# Patient Record
Sex: FEMALE | Race: WHITE | NOT HISPANIC OR LATINO | Employment: FULL TIME | URBAN - METROPOLITAN AREA
[De-identification: names, ages, dates, MRNs, and addresses within clinical notes are randomized per-mention and may not be internally consistent; named-entity substitution may affect disease eponyms.]

---

## 2019-03-29 ENCOUNTER — OFFICE VISIT (OUTPATIENT)
Dept: FAMILY MEDICINE CLINIC | Facility: CLINIC | Age: 29
End: 2019-03-29
Payer: COMMERCIAL

## 2019-03-29 VITALS
RESPIRATION RATE: 16 BRPM | TEMPERATURE: 98.2 F | HEART RATE: 67 BPM | BODY MASS INDEX: 25.67 KG/M2 | HEIGHT: 64 IN | SYSTOLIC BLOOD PRESSURE: 112 MMHG | OXYGEN SATURATION: 96 % | WEIGHT: 150.38 LBS | DIASTOLIC BLOOD PRESSURE: 64 MMHG

## 2019-03-29 DIAGNOSIS — R53.82 CHRONIC FATIGUE: ICD-10-CM

## 2019-03-29 DIAGNOSIS — E55.9 VITAMIN D INSUFFICIENCY: ICD-10-CM

## 2019-03-29 DIAGNOSIS — Z00.00 HEALTHCARE MAINTENANCE: Primary | ICD-10-CM

## 2019-03-29 DIAGNOSIS — E66.3 OVERWEIGHT (BMI 25.0-29.9): ICD-10-CM

## 2019-03-29 DIAGNOSIS — Z23 ENCOUNTER FOR IMMUNIZATION: ICD-10-CM

## 2019-03-29 PROCEDURE — 3725F SCREEN DEPRESSION PERFORMED: CPT | Performed by: FAMILY MEDICINE

## 2019-03-29 PROCEDURE — 99395 PREV VISIT EST AGE 18-39: CPT | Performed by: FAMILY MEDICINE

## 2019-03-29 RX ORDER — PHENTERMINE HYDROCHLORIDE 15 MG/1
15 CAPSULE ORAL EVERY MORNING
Qty: 30 CAPSULE | Refills: 0 | Status: SHIPPED | OUTPATIENT
Start: 2019-03-29 | End: 2021-06-07 | Stop reason: ALTCHOICE

## 2019-03-29 NOTE — PATIENT INSTRUCTIONS

## 2019-03-29 NOTE — PROGRESS NOTES
Assessment/Plan:     Diagnoses and all orders for this visit:    Healthcare maintenance  Comments:  Discussed about immunizations, diet, exercise and safety measures  Orders:  -     Lipid Panel with Direct LDL reflex; Future    BMI 25 0-25 9,adult  Comments:  I am going to start her on phentermine 15 mg daily  It was discussed about side effects  It was discussed about low carb diet and regular exercise  Orders:  -     phentermine 15 MG capsule; Take 1 capsule (15 mg total) by mouth every morning    Encounter for immunization  -     PREFERRED: influenza vaccine, 2431-0332, quadrivalent, recombinant, PF, 0 5 mL (FLUBLOK)    Vitamin D insufficiency  -     Vitamin D 25 hydroxy; Future    Chronic fatigue  -     CBC and differential; Future  -     Comprehensive metabolic panel; Future  -     TSH, 3rd generation with Free T4 reflex; Future    Overweight (BMI 25 0-29  9)          Patient Instructions     Weight Management   AMBULATORY CARE:   Why it is important to manage your weight:  Being overweight increases your risk of health conditions such as heart disease, high blood pressure, type 2 diabetes, and certain types of cancer  It can also increase your risk for osteoarthritis, sleep apnea, and other respiratory problems  Aim for a slow, steady weight loss  Even a small amount of weight loss can lower your risk of health problems  How to lose weight safely:  A safe and healthy way to lose weight is to eat fewer calories and get regular exercise  You can lose up about 1 pound a week by decreasing the number of calories you eat by 500 calories each day  You can decrease calories by eating smaller portion sizes or by cutting out high-calorie foods  Read labels to find out how many calories are in the foods you eat  You can also burn calories with exercise such as walking, swimming, or biking  You will be more likely to keep weight off if you make these changes part of your lifestyle     Healthy meal plan for weight management:  A healthy meal plan includes a variety of foods, contains fewer calories, and helps you stay healthy  A healthy meal plan includes the following:  · Eat whole-grain foods more often  A healthy meal plan should contain fiber  Fiber is the part of grains, fruits, and vegetables that is not broken down by your body  Whole-grain foods are healthy and provide extra fiber in your diet  Some examples of whole-grain foods are whole-wheat breads and pastas, oatmeal, brown rice, and bulgur  · Eat a variety of vegetables every day  Include dark, leafy greens such as spinach, kale, saba greens, and mustard greens  Eat yellow and orange vegetables such as carrots, sweet potatoes, and winter squash  · Eat a variety of fruits every day  Choose fresh or canned fruit (canned in its own juice or light syrup) instead of juice  Fruit juice has very little or no fiber  · Eat low-fat dairy foods  Drink fat-free (skim) milk or 1% milk  Eat fat-free yogurt and low-fat cottage cheese  Try low-fat cheeses such as mozzarella and other reduced-fat cheeses  · Choose meat and other protein foods that are low in fat  Choose beans or other legumes such as split peas or lentils  Choose fish, skinless poultry (chicken or turkey), or lean cuts of red meat (beef or pork)  Before you cook meat or poultry, cut off any visible fat  · Use less fat and oil  Try baking foods instead of frying them  Add less fat, such as margarine, sour cream, regular salad dressing and mayonnaise to foods  Eat fewer high-fat foods  Some examples of high-fat foods include french fries, doughnuts, ice cream, and cakes  · Eat fewer sweets  Limit foods and drinks that are high in sugar  This includes candy, cookies, regular soda, and sweetened drinks  Ways to decrease calories:   · Eat smaller portions  ¨ Use a small plate with smaller servings  ¨ Do not eat second helpings      ¨ When you eat at a restaurant, ask for a box and place half of your meal in the box before you eat  ¨ Share an entrée with someone else  · Replace high-calorie snacks with healthy, low-calorie snacks  ¨ Choose fresh fruit, vegetables, fat-free rice cakes, or air-popped popcorn instead of potato chips, nuts, or chocolate  ¨ Choose water or calorie-free drinks instead of soda or sweetened drinks  · Eat regular meals  Skipping meals can lead to overeating later in the day  Eat a healthy snack in place of a meal if you do not have time to eat a regular meal      · Do not shop for groceries when you are hungry  You may be more likely to make unhealthy food choices  Take a grocery list of healthy foods and shop after you have eaten  Exercise:  Exercise at least 30 minutes per day on most days of the week  Some examples of exercise include walking, biking, dancing, and swimming  You can also fit in more physical activity by taking the stairs instead of the elevator or parking farther away from stores  Ask your healthcare provider about the best exercise plan for you  Other things to consider as you try to lose weight:   · Be aware of situations that may give you the urge to overeat, such as eating while watching television  Find ways to avoid these situations  For example, read a book, go for a walk, or do crafts  · Meet with a weight loss support group or friends who are also trying to lose weight  This may help you stay motivated to continue working on your weight loss goals  © 2017 [employer] Information is for End User's use only and may not be sold, redistributed or otherwise used for commercial purposes  All illustrations and images included in CareNotes® are the copyrighted property of [employer]  or Kike Marquez  The above information is an  only  It is not intended as medical advice for individual conditions or treatments   Talk to your doctor, nurse or pharmacist before following any medical regimen to see if it is safe and effective for you  Return in about 1 month (around 4/29/2019)  Subjective:      Patient ID: Brent Chase is a 30 y o  female  Chief Complaint   Patient presents with    Physical Exam       She is here today for wellness exam   She complained of gaining weight since the last time she was seen here  She watch her diet but she does not exercise  The following portions of the patient's history were reviewed and updated as appropriate: allergies, current medications, past family history, past medical history, past social history, past surgical history and problem list     Review of Systems   Constitutional: Negative for chills and fever  HENT: Negative for trouble swallowing  Eyes: Negative for visual disturbance  Respiratory: Negative for cough and shortness of breath  Cardiovascular: Negative for chest pain, palpitations and leg swelling  Gastrointestinal: Negative for abdominal pain, constipation and diarrhea  Endocrine: Negative for cold intolerance and heat intolerance  Genitourinary: Negative for difficulty urinating and dysuria  Musculoskeletal: Negative for gait problem  Skin: Negative for rash  Neurological: Negative for dizziness, tremors, seizures and headaches  Hematological: Negative for adenopathy  Psychiatric/Behavioral: Negative for behavioral problems  Current Outpatient Medications   Medication Sig Dispense Refill    phentermine 15 MG capsule Take 1 capsule (15 mg total) by mouth every morning 30 capsule 0     No current facility-administered medications for this visit  Objective:    /64 (BP Location: Left arm, Patient Position: Sitting, Cuff Size: Adult)   Pulse 67   Temp 98 2 °F (36 8 °C) (Tympanic)   Resp 16   Ht 5' 4" (1 626 m)   Wt 68 2 kg (150 lb 6 oz)   SpO2 96%   BMI 25 81 kg/m²        Physical Exam   Constitutional: She is oriented to person, place, and time   She appears well-developed and well-nourished  HENT:   Head: Normocephalic and atraumatic  Eyes: Pupils are equal, round, and reactive to light  EOM are normal    Neck: Normal range of motion  Neck supple  Cardiovascular: Normal rate, regular rhythm and normal heart sounds  Pulmonary/Chest: Effort normal and breath sounds normal    Abdominal: Soft  Bowel sounds are normal    Musculoskeletal: Normal range of motion  She exhibits no edema  Lymphadenopathy:     She has no cervical adenopathy  Neurological: She is alert and oriented to person, place, and time  No cranial nerve deficit  Skin: Skin is warm  Psychiatric: She has a normal mood and affect  Nursing note and vitals reviewed  David Vizcarra MD BMI Counseling: Body mass index is 25 81 kg/m²  Discussed the patient's BMI with her  The BMI is above average  BMI counseling and education was provided to the patient  Nutrition recommendations include reducing portion sizes, decreasing overall calorie intake, 3-5 servings of fruits/vegetables daily, reducing fast food intake and consuming healthier snacks  Exercise recommendations include moderate aerobic physical activity for 150 minutes/week

## 2019-04-09 ENCOUNTER — APPOINTMENT (OUTPATIENT)
Dept: LAB | Facility: IMAGING CENTER | Age: 29
End: 2019-04-09
Payer: COMMERCIAL

## 2019-04-09 ENCOUNTER — TRANSCRIBE ORDERS (OUTPATIENT)
Dept: ADMINISTRATIVE | Facility: HOSPITAL | Age: 29
End: 2019-04-09

## 2019-04-09 DIAGNOSIS — Z00.00 HEALTHCARE MAINTENANCE: ICD-10-CM

## 2019-04-09 DIAGNOSIS — E55.9 VITAMIN D INSUFFICIENCY: ICD-10-CM

## 2019-04-09 DIAGNOSIS — R53.82 CHRONIC FATIGUE: ICD-10-CM

## 2019-04-09 LAB
25(OH)D3 SERPL-MCNC: 15.5 NG/ML (ref 30–100)
ALBUMIN SERPL BCP-MCNC: 4.2 G/DL (ref 3.5–5)
ALP SERPL-CCNC: 52 U/L (ref 46–116)
ALT SERPL W P-5'-P-CCNC: 16 U/L (ref 12–78)
ANION GAP SERPL CALCULATED.3IONS-SCNC: 2 MMOL/L (ref 4–13)
AST SERPL W P-5'-P-CCNC: 8 U/L (ref 5–45)
BASOPHILS # BLD AUTO: 0.02 THOUSANDS/ΜL (ref 0–0.1)
BASOPHILS NFR BLD AUTO: 0 % (ref 0–1)
BILIRUB SERPL-MCNC: 0.54 MG/DL (ref 0.2–1)
BUN SERPL-MCNC: 7 MG/DL (ref 5–25)
CALCIUM SERPL-MCNC: 8.8 MG/DL (ref 8.3–10.1)
CHLORIDE SERPL-SCNC: 106 MMOL/L (ref 100–108)
CHOLEST SERPL-MCNC: 183 MG/DL (ref 50–200)
CO2 SERPL-SCNC: 28 MMOL/L (ref 21–32)
CREAT SERPL-MCNC: 0.69 MG/DL (ref 0.6–1.3)
EOSINOPHIL # BLD AUTO: 0.03 THOUSAND/ΜL (ref 0–0.61)
EOSINOPHIL NFR BLD AUTO: 1 % (ref 0–6)
ERYTHROCYTE [DISTWIDTH] IN BLOOD BY AUTOMATED COUNT: 12.1 % (ref 11.6–15.1)
GFR SERPL CREATININE-BSD FRML MDRD: 116 ML/MIN/1.73SQ M
GLUCOSE P FAST SERPL-MCNC: 86 MG/DL (ref 65–99)
HCT VFR BLD AUTO: 42.4 % (ref 34.8–46.1)
HDLC SERPL-MCNC: 42 MG/DL (ref 40–60)
HGB BLD-MCNC: 14.1 G/DL (ref 11.5–15.4)
IMM GRANULOCYTES # BLD AUTO: 0.01 THOUSAND/UL (ref 0–0.2)
IMM GRANULOCYTES NFR BLD AUTO: 0 % (ref 0–2)
LDLC SERPL CALC-MCNC: 121 MG/DL (ref 0–100)
LYMPHOCYTES # BLD AUTO: 2.41 THOUSANDS/ΜL (ref 0.6–4.47)
LYMPHOCYTES NFR BLD AUTO: 42 % (ref 14–44)
MCH RBC QN AUTO: 30.5 PG (ref 26.8–34.3)
MCHC RBC AUTO-ENTMCNC: 33.3 G/DL (ref 31.4–37.4)
MCV RBC AUTO: 92 FL (ref 82–98)
MONOCYTES # BLD AUTO: 0.31 THOUSAND/ΜL (ref 0.17–1.22)
MONOCYTES NFR BLD AUTO: 5 % (ref 4–12)
NEUTROPHILS # BLD AUTO: 2.97 THOUSANDS/ΜL (ref 1.85–7.62)
NEUTS SEG NFR BLD AUTO: 52 % (ref 43–75)
NRBC BLD AUTO-RTO: 0 /100 WBCS
PLATELET # BLD AUTO: 244 THOUSANDS/UL (ref 149–390)
PMV BLD AUTO: 11.6 FL (ref 8.9–12.7)
POTASSIUM SERPL-SCNC: 3.9 MMOL/L (ref 3.5–5.3)
PROT SERPL-MCNC: 7.3 G/DL (ref 6.4–8.2)
RBC # BLD AUTO: 4.62 MILLION/UL (ref 3.81–5.12)
SODIUM SERPL-SCNC: 136 MMOL/L (ref 136–145)
TRIGL SERPL-MCNC: 99 MG/DL
TSH SERPL DL<=0.05 MIU/L-ACNC: 1.63 UIU/ML (ref 0.36–3.74)
WBC # BLD AUTO: 5.75 THOUSAND/UL (ref 4.31–10.16)

## 2019-04-09 PROCEDURE — 80061 LIPID PANEL: CPT

## 2019-04-09 PROCEDURE — 82306 VITAMIN D 25 HYDROXY: CPT

## 2019-04-09 PROCEDURE — 85025 COMPLETE CBC W/AUTO DIFF WBC: CPT

## 2019-04-09 PROCEDURE — 84443 ASSAY THYROID STIM HORMONE: CPT

## 2019-04-09 PROCEDURE — 36415 COLL VENOUS BLD VENIPUNCTURE: CPT

## 2019-04-09 PROCEDURE — 80053 COMPREHEN METABOLIC PANEL: CPT

## 2019-04-10 ENCOUNTER — TELEPHONE (OUTPATIENT)
Dept: FAMILY MEDICINE CLINIC | Facility: CLINIC | Age: 29
End: 2019-04-10

## 2019-04-10 DIAGNOSIS — E55.9 VITAMIN D INSUFFICIENCY: Primary | ICD-10-CM

## 2019-04-10 RX ORDER — ERGOCALCIFEROL 1.25 MG/1
50000 CAPSULE ORAL WEEKLY
Qty: 4 CAPSULE | Refills: 3 | Status: SHIPPED | OUTPATIENT
Start: 2019-04-10

## 2020-01-27 ENCOUNTER — OFFICE VISIT (OUTPATIENT)
Dept: URGENT CARE | Age: 30
End: 2020-01-27
Payer: COMMERCIAL

## 2020-01-27 VITALS
OXYGEN SATURATION: 100 % | WEIGHT: 156 LBS | HEART RATE: 60 BPM | BODY MASS INDEX: 26.63 KG/M2 | DIASTOLIC BLOOD PRESSURE: 71 MMHG | TEMPERATURE: 98.1 F | RESPIRATION RATE: 20 BRPM | SYSTOLIC BLOOD PRESSURE: 118 MMHG | HEIGHT: 64 IN

## 2020-01-27 DIAGNOSIS — H92.01 RIGHT EAR PAIN: Primary | ICD-10-CM

## 2020-01-27 DIAGNOSIS — H92.01 EARACHE ON RIGHT: ICD-10-CM

## 2020-01-27 PROCEDURE — 99283 EMERGENCY DEPT VISIT LOW MDM: CPT | Performed by: NURSE PRACTITIONER

## 2020-01-27 PROCEDURE — 99203 OFFICE O/P NEW LOW 30 MIN: CPT | Performed by: NURSE PRACTITIONER

## 2020-01-27 PROCEDURE — G0382 LEV 3 HOSP TYPE B ED VISIT: HCPCS | Performed by: NURSE PRACTITIONER

## 2020-01-27 RX ORDER — METHYLPREDNISOLONE 4 MG/1
TABLET ORAL
Qty: 21 TABLET | Refills: 0 | Status: SHIPPED | OUTPATIENT
Start: 2020-01-27 | End: 2021-06-07 | Stop reason: ALTCHOICE

## 2020-01-27 NOTE — LETTER
January 27, 2020     Patient: Sandra Hernandez   YOB: 1990   Date of Visit: 1/27/2020       To Whom It May Concern: It is my medical opinion that Sandra Hernandez may return to work on 01/28/2020 if fever free, if not please excuse till 01/29/2020         Sincerely,        BOSTON Cruz    CC: No Recipients

## 2020-01-27 NOTE — PROGRESS NOTES
NAME: Alice Hair is a 30 y o  female  : 1990    MRN: [id number]    /71 (BP Location: Left arm, Patient Position: Sitting, Cuff Size: Standard)   Pulse 60   Temp 98 1 °F (36 7 °C) (Tympanic)   Resp 20   Ht 5' 4" (1 626 m)   Wt 70 8 kg (156 lb)   LMP 2019 (Exact Date)   SpO2 100%   BMI 26 78 kg/m²     Assessment and Plan   Right ear pain [H92 01]  1  Right ear pain  methylPREDNISolone 4 MG tablet therapy pack   2  Earache on right  methylPREDNISolone 4 MG tablet therapy pack       Chichi was seen today for earache  Diagnoses and all orders for this visit:    Right ear pain  -     methylPREDNISolone 4 MG tablet therapy pack; Use as directed on package    Earache on right  -     methylPREDNISolone 4 MG tablet therapy pack; Use as directed on package        Patient Instructions   Patient Instructions   Take meds as directed  Follow up with pcp   Tylenol and motrin for fever or pain  Take steroids as directed      Earache   WHAT YOU NEED TO KNOW:   An earache can be caused by a problem within your ear or from another body area  Common causes include earwax buildup, objects in your ear, injury, infections, or jaw or dental problems  Less often, earaches may be caused by arthritis in your upper spine  DISCHARGE INSTRUCTIONS:   Return to the emergency department if:   · You have a severe earache  · You have ear pain with itching, hearing loss, dizziness, a feeling of fullness in your ear, or ringing in your ears  Contact your healthcare provider if:   · Your ear pain worsens or does not go away with treatment  · You have drainage from your ear  · You have a fever  · Your outer ear becomes red, swollen, and warm  · You have questions or concerns about your condition or care  Medicines: You may need any of the following:  · NSAIDs , such as ibuprofen, help decrease swelling, pain, and fever  This medicine is available with or without a doctor's order   NSAIDs can cause stomach bleeding or kidney problems in certain people  If you take blood thinner medicine, always ask if NSAIDs are safe for you  Always read the medicine label and follow directions  Do not give these medicines to children under 10months of age without direction from your child's healthcare provider  · Acetaminophen  decreases pain and fever  It is available without a doctor's order  Ask how much to take and how often to take it  Follow directions  Acetaminophen can cause liver damage if not taken correctly  · Do not give aspirin to children under 25years of age  Your child could develop Reye syndrome if he takes aspirin  Reye syndrome can cause life-threatening brain and liver damage  Check your child's medicine labels for aspirin, salicylates, or oil of wintergreen  · Take your medicine as directed  Call your healthcare provider if you think your medicine is not helping or if you have side effects  Tell him if you are allergic to any medicine  Keep a list of the medicines, vitamins, and herbs you take  Include the amounts, and when and why you take them  Bring the list or the pill bottles to follow-up visits  Carry your medicine list with you in case of an emergency  Follow up with your healthcare provider as directed:  Write down your questions so you remember to ask them during your visits  © 2017 [employer] Information is for End User's use only and may not be sold, redistributed or otherwise used for commercial purposes  All illustrations and images included in CareNotes® are the copyrighted property of [employer]  or Kike Marquez  The above information is an  only  It is not intended as medical advice for individual conditions or treatments  Talk to your doctor, nurse or pharmacist before following any medical regimen to see if it is safe and effective for you  Proceed to ER if symptoms worsen      Chief Complaint     Chief Complaint Patient presents with   Charlet Karl     started about 4 days ago in her right ear  History of Present Illness     Pt here today with right ear pain, present for 4 days and not getting better  She denies having any sinus congestion, sore throat, fevers or post nasal drip, she has used nothing over the counter  Encouraged pt to use flonase OTC and to use medrol dose pack starting tomorrow to help with the pain  The pain radiates down the right side of the ear into the jaw, no swelling no pain or difficulty swallowing  Review of Systems   Review of Systems   Constitutional: Negative for chills and fever  HENT: Positive for ear pain (right)  Respiratory: Negative  Cardiovascular: Negative  Gastrointestinal: Negative  Genitourinary: Negative  Musculoskeletal: Negative  Skin: Negative  Neurological: Negative  Hematological: Negative  Current Medications       Current Outpatient Medications:     ergocalciferol (VITAMIN D2) 50,000 units, Take 1 capsule (50,000 Units total) by mouth once a week, Disp: 4 capsule, Rfl: 3    methylPREDNISolone 4 MG tablet therapy pack, Use as directed on package, Disp: 21 tablet, Rfl: 0    phentermine 15 MG capsule, Take 1 capsule (15 mg total) by mouth every morning (Patient not taking: Reported on 1/27/2020), Disp: 30 capsule, Rfl: 0    Current Allergies     Allergies as of 01/27/2020 - Reviewed 01/27/2020   Allergen Reaction Noted    No active allergies  02/23/2018              History reviewed  No pertinent past medical history  History reviewed  No pertinent surgical history  Family History   Problem Relation Age of Onset    No Known Problems Mother     No Known Problems Father          Medications have been verified      The following portions of the patient's history were reviewed and updated as appropriate: allergies, current medications, past family history, past medical history, past social history, past surgical history and problem list     Objective   /71 (BP Location: Left arm, Patient Position: Sitting, Cuff Size: Standard)   Pulse 60   Temp 98 1 °F (36 7 °C) (Tympanic)   Resp 20   Ht 5' 4" (1 626 m)   Wt 70 8 kg (156 lb)   LMP 12/27/2019 (Exact Date)   SpO2 100%   BMI 26 78 kg/m²      Physical Exam     Physical Exam   Constitutional: She appears well-developed and well-nourished  No distress  HENT:   Head: Normocephalic  Right Ear: Hearing, tympanic membrane and ear canal normal    Left Ear: Hearing, tympanic membrane and ear canal normal    Nose: Nose normal  Right sinus exhibits no maxillary sinus tenderness  Left sinus exhibits no maxillary sinus tenderness  Mouth/Throat: Uvula is midline, oropharynx is clear and moist and mucous membranes are normal  Tonsils are 0 on the right  Tonsils are 0 on the left  No tonsillar exudate  Eyes: Pupils are equal, round, and reactive to light  Neck: Trachea normal, normal range of motion and full passive range of motion without pain  Carotid bruit is not present  No thyroid mass present  Cardiovascular: Normal rate, regular rhythm and normal heart sounds  Pulmonary/Chest: Effort normal and breath sounds normal  No stridor  She has no decreased breath sounds  Skin: Skin is warm  Capillary refill takes less than 2 seconds  No rash noted         Jace Cockayne, CRNP

## 2020-01-28 NOTE — PATIENT INSTRUCTIONS
Take meds as directed  Follow up with pcp   Tylenol and motrin for fever or pain  Take steroids as directed      Earache   WHAT YOU NEED TO KNOW:   An earache can be caused by a problem within your ear or from another body area  Common causes include earwax buildup, objects in your ear, injury, infections, or jaw or dental problems  Less often, earaches may be caused by arthritis in your upper spine  DISCHARGE INSTRUCTIONS:   Return to the emergency department if:   · You have a severe earache  · You have ear pain with itching, hearing loss, dizziness, a feeling of fullness in your ear, or ringing in your ears  Contact your healthcare provider if:   · Your ear pain worsens or does not go away with treatment  · You have drainage from your ear  · You have a fever  · Your outer ear becomes red, swollen, and warm  · You have questions or concerns about your condition or care  Medicines: You may need any of the following:  · NSAIDs , such as ibuprofen, help decrease swelling, pain, and fever  This medicine is available with or without a doctor's order  NSAIDs can cause stomach bleeding or kidney problems in certain people  If you take blood thinner medicine, always ask if NSAIDs are safe for you  Always read the medicine label and follow directions  Do not give these medicines to children under 10months of age without direction from your child's healthcare provider  · Acetaminophen  decreases pain and fever  It is available without a doctor's order  Ask how much to take and how often to take it  Follow directions  Acetaminophen can cause liver damage if not taken correctly  · Do not give aspirin to children under 25years of age  Your child could develop Reye syndrome if he takes aspirin  Reye syndrome can cause life-threatening brain and liver damage  Check your child's medicine labels for aspirin, salicylates, or oil of wintergreen  · Take your medicine as directed    Call your healthcare provider if you think your medicine is not helping or if you have side effects  Tell him if you are allergic to any medicine  Keep a list of the medicines, vitamins, and herbs you take  Include the amounts, and when and why you take them  Bring the list or the pill bottles to follow-up visits  Carry your medicine list with you in case of an emergency  Follow up with your healthcare provider as directed:  Write down your questions so you remember to ask them during your visits  © 2017 [employer] Information is for End User's use only and may not be sold, redistributed or otherwise used for commercial purposes  All illustrations and images included in CareNotes® are the copyrighted property of [employer]  or Kike Marquez  The above information is an  only  It is not intended as medical advice for individual conditions or treatments  Talk to your doctor, nurse or pharmacist before following any medical regimen to see if it is safe and effective for you

## 2020-06-02 ENCOUNTER — OFFICE VISIT (OUTPATIENT)
Dept: FAMILY MEDICINE CLINIC | Facility: CLINIC | Age: 30
End: 2020-06-02
Payer: COMMERCIAL

## 2020-06-02 VITALS
BODY MASS INDEX: 26.18 KG/M2 | HEIGHT: 64 IN | DIASTOLIC BLOOD PRESSURE: 78 MMHG | SYSTOLIC BLOOD PRESSURE: 118 MMHG | WEIGHT: 153.38 LBS | OXYGEN SATURATION: 99 % | TEMPERATURE: 97.6 F | HEART RATE: 61 BPM | RESPIRATION RATE: 16 BRPM

## 2020-06-02 DIAGNOSIS — R21 RASH: Primary | ICD-10-CM
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
## 2020-06-02 PROCEDURE — 99213 OFFICE O/P EST LOW 20 MIN: CPT | Performed by: FAMILY MEDICINE

## 2020-06-02 PROCEDURE — 3008F BODY MASS INDEX DOCD: CPT | Performed by: FAMILY MEDICINE

## 2020-06-02 PROCEDURE — 1036F TOBACCO NON-USER: CPT | Performed by: FAMILY MEDICINE

## 2020-06-02 RX ORDER — TRIAMCINOLONE ACETONIDE 5 MG/G
CREAM TOPICAL 3 TIMES DAILY
Qty: 30 G | Refills: 0 | Status: SHIPPED | OUTPATIENT
Start: 2020-06-02 | End: 2021-06-07 | Stop reason: ALTCHOICE

## 2020-06-02 RX ORDER — PREDNISONE 50 MG/1
50 TABLET ORAL DAILY
Qty: 5 TABLET | Refills: 0 | Status: SHIPPED | OUTPATIENT
Start: 2020-06-02 | End: 2020-06-07

## 2020-06-03 ENCOUNTER — TELEPHONE (OUTPATIENT)
Dept: FAMILY MEDICINE CLINIC | Facility: CLINIC | Age: 30
End: 2020-06-03

## 2020-06-11 ENCOUNTER — TELEPHONE (OUTPATIENT)
Dept: FAMILY MEDICINE CLINIC | Facility: CLINIC | Age: 30
End: 2020-06-11

## 2020-06-11 DIAGNOSIS — R21 RASH: Primary | ICD-10-CM

## 2021-05-06 ENCOUNTER — OFFICE VISIT (OUTPATIENT)
Dept: FAMILY MEDICINE CLINIC | Facility: CLINIC | Age: 31
End: 2021-05-06
Payer: COMMERCIAL

## 2021-05-06 VITALS
OXYGEN SATURATION: 98 % | DIASTOLIC BLOOD PRESSURE: 64 MMHG | WEIGHT: 150.25 LBS | HEART RATE: 73 BPM | SYSTOLIC BLOOD PRESSURE: 102 MMHG | HEIGHT: 64 IN | RESPIRATION RATE: 16 BRPM | BODY MASS INDEX: 25.65 KG/M2 | TEMPERATURE: 97.7 F

## 2021-05-06 DIAGNOSIS — Z00.00 HEALTHCARE MAINTENANCE: Primary | ICD-10-CM

## 2021-05-06 DIAGNOSIS — Z82.41 FAMILY HISTORY OF SUDDEN CARDIAC DEATH: ICD-10-CM

## 2021-05-06 PROCEDURE — 3725F SCREEN DEPRESSION PERFORMED: CPT | Performed by: NURSE PRACTITIONER

## 2021-05-06 PROCEDURE — 99395 PREV VISIT EST AGE 18-39: CPT | Performed by: NURSE PRACTITIONER

## 2021-05-06 RX ORDER — KETOCONAZOLE 20 MG/ML
SHAMPOO TOPICAL
COMMUNITY
Start: 2021-04-21

## 2021-05-06 NOTE — PROGRESS NOTES
Assessment/Plan:    Healthcare maintenance  - Discussed immunizations, screenings, healthy diet, exercise, and safety measures  - Discussed importance of regular dental and eye exams   - Routine blood work ordered  Will contact patient with results  Family history of sudden cardiac death  - Referral placed for cardiology  - Will continue to follow up  Diagnoses and all orders for this visit:    Healthcare maintenance  -     CBC and differential; Future  -     Comprehensive metabolic panel; Future  -     Lipid Panel with Direct LDL reflex; Future  -     TSH, 3rd generation with Free T4 reflex; Future    Family history of sudden cardiac death  -     Ambulatory referral to Cardiology; Future    Other orders  -     ketoconazole (NIZORAL) 2 % shampoo        Subjective:      Patient ID: Sherice Quick is a 33 y o  female  Patient presents today for annual physical  She does not take any medication on a daily basis  She did just find out she is pregnant and she is following with her obstetrician  She has no current complaints but she does state that she is concerned regarding a family history of aortic stenosis and sudden cardiac death  She states that both of her aunt's passed away in their thirties due to cardiac issues  Her grandmother was diagnosed with aortic stenosis in her thirties and suffered a heart attack that she passed away from in her fifties  Her mother recently passed away from a complication due to lung surgery  She states that they did not do an autopsy on her mother so she is unaware if she had any cardiac issues that may have lead to her death  Patient currently denies any chest pain, chest tightness, dizziness, decreased activity intolerance, or shortness of breath             The following portions of the patient's history were reviewed and updated as appropriate: allergies, current medications, past family history, past medical history, past social history, past surgical history and problem list     Review of Systems   Constitutional: Negative for fatigue and fever  HENT: Negative for congestion, rhinorrhea and trouble swallowing  Eyes: Negative for pain and visual disturbance  Respiratory: Negative for cough, chest tightness and shortness of breath  Cardiovascular: Negative for chest pain and palpitations  Gastrointestinal: Negative for abdominal pain and blood in stool  Endocrine: Negative for cold intolerance and heat intolerance  Genitourinary: Negative for difficulty urinating and dysuria  Musculoskeletal: Negative for gait problem  Skin: Negative for rash  Allergic/Immunologic: Negative for environmental allergies  Neurological: Negative for dizziness, syncope and headaches  Hematological: Negative for adenopathy  Psychiatric/Behavioral: Negative for behavioral problems  Objective:      /64 (BP Location: Left arm, Patient Position: Sitting, Cuff Size: Adult)   Pulse 73   Temp 97 7 °F (36 5 °C) (Tympanic)   Resp 16   Ht 5' 4" (1 626 m)   Wt 68 2 kg (150 lb 4 oz)   SpO2 98%   BMI 25 79 kg/m²          Physical Exam  Vitals signs and nursing note reviewed  Constitutional:       Appearance: Normal appearance  HENT:      Head: Normocephalic and atraumatic  Right Ear: Tympanic membrane, ear canal and external ear normal       Left Ear: Tympanic membrane, ear canal and external ear normal       Nose: No congestion  Eyes:      Extraocular Movements: Extraocular movements intact  Conjunctiva/sclera: Conjunctivae normal       Pupils: Pupils are equal, round, and reactive to light  Neck:      Musculoskeletal: Normal range of motion  Cardiovascular:      Rate and Rhythm: Normal rate and regular rhythm  Heart sounds: Normal heart sounds  Pulmonary:      Effort: Pulmonary effort is normal       Breath sounds: Normal breath sounds  Abdominal:      General: Bowel sounds are normal       Palpations: Abdomen is soft  Musculoskeletal: Normal range of motion  Right lower leg: No edema  Left lower leg: No edema  Lymphadenopathy:      Cervical: No cervical adenopathy  Skin:     General: Skin is warm and dry  Neurological:      Mental Status: She is alert and oriented to person, place, and time  Cranial Nerves: No cranial nerve deficit  Motor: Motor function is intact  Coordination: Coordination is intact  Finger-Nose-Finger Test normal    Psychiatric:         Mood and Affect: Mood normal          Behavior: Behavior normal        BMI Counseling: Body mass index is 25 79 kg/m²  The BMI is above normal  Nutrition recommendations include decreasing portion sizes, encouraging healthy choices of fruits and vegetables, decreasing fast food intake and reducing intake of cholesterol  Exercise recommendations include moderate physical activity 150 minutes/week and exercising 3-5 times per week

## 2021-05-06 NOTE — ASSESSMENT & PLAN NOTE
- Discussed immunizations, screenings, healthy diet, exercise, and safety measures  - Discussed importance of regular dental and eye exams   - Routine blood work ordered  Will contact patient with results

## 2021-06-07 ENCOUNTER — CONSULT (OUTPATIENT)
Dept: CARDIOLOGY CLINIC | Facility: CLINIC | Age: 31
End: 2021-06-07
Payer: COMMERCIAL

## 2021-06-07 VITALS
SYSTOLIC BLOOD PRESSURE: 112 MMHG | DIASTOLIC BLOOD PRESSURE: 68 MMHG | HEART RATE: 65 BPM | BODY MASS INDEX: 25.61 KG/M2 | WEIGHT: 150 LBS | HEIGHT: 64 IN | OXYGEN SATURATION: 97 %

## 2021-06-07 DIAGNOSIS — Z82.41 FAMILY HISTORY OF SUDDEN CARDIAC DEATH: Primary | ICD-10-CM

## 2021-06-07 DIAGNOSIS — R06.00 DYSPNEA ON EXERTION: ICD-10-CM

## 2021-06-07 DIAGNOSIS — Z82.49 FAMILY HISTORY OF ATHEROSCLEROSIS: ICD-10-CM

## 2021-06-07 PROBLEM — I10 ESSENTIAL HYPERTENSION: Status: RESOLVED | Noted: 2021-06-07 | Resolved: 2021-06-07

## 2021-06-07 PROBLEM — I10 ESSENTIAL HYPERTENSION: Status: ACTIVE | Noted: 2021-06-07

## 2021-06-07 PROBLEM — R06.09 DYSPNEA ON EXERTION: Status: ACTIVE | Noted: 2021-06-07

## 2021-06-07 PROCEDURE — 1036F TOBACCO NON-USER: CPT | Performed by: INTERNAL MEDICINE

## 2021-06-07 PROCEDURE — 3008F BODY MASS INDEX DOCD: CPT | Performed by: INTERNAL MEDICINE

## 2021-06-07 PROCEDURE — 93000 ELECTROCARDIOGRAM COMPLETE: CPT | Performed by: INTERNAL MEDICINE

## 2021-06-07 PROCEDURE — 99204 OFFICE O/P NEW MOD 45 MIN: CPT | Performed by: INTERNAL MEDICINE

## 2021-06-07 NOTE — PATIENT INSTRUCTIONS
Echocardiogram planned to evaluate heart function and rule out significant valvular heart disease  CT Calcium score next year post delivery  Continue modification of cardiac risk factors including increase in physical activity, plant  based or Mediterranean style start diet, maintenance of healthy body weight and avoidance of tobacco products  Report any worsening cardiac symptoms (chest pain,shortness of breath or fainting)  Keep follow-up as planned with primary care and OB

## 2021-06-07 NOTE — LETTER
2021     Pratik Terrell MD  [address]  [address] [place] [id number]    Patient: Andre Vaca   YOB: 1990   Date of Visit: 2021       Dear Dr Swati Frias: Thank you for referring Andre Vaca to me for evaluation  Below are my notes for this consultation  If you have questions, please do not hesitate to call me  I look forward to following your patient along with you  Sincerely,        Lory Pritchard MD        CC: Joana Raines, [address] MD  2021  3:00 PM  Incomplete  [facility] [place]  [address] [facility] [address]  [place] [address] [place] [address]  Phone#  [phone]  Fax#  [phone]  [facility]'s Cardiology Office Consultation             NAME: Andre Vaca  AGE: 33 y o  SEX: female   : 1990   MRN: [id number]    DATE: 2021  TIME: 3:00 PM    Assessment and plan:    1  Family history of sudden cardiac death  Assessment & Plan:   Family history of premature atherosclerosis, aortic valve disease and sudden death in multiple female 1st degree relatives  She also has mild dyslipidemia  She will need further risk stratification with calcium scoring once she is more than 3 months postpartum  We will plan this next year  In the meantime she will continue risk factor modification including  Regular walking and healthy diet  Her QTC is normal on the EKG  Orders:  -     Ambulatory referral to Cardiology    2  Family history of atherosclerosis  -     POCT ECG    3  Dyspnea on exertion  Assessment & Plan:   Exertional dyspnea, attributed to pregnancy  Low voltage on the EKG  Echocardiogram planned to evaluate LV systolic function , possible congenital heart disease and rule out pericardial effusion  Orders:  -     Echo complete with contrast if indicated; Future; Expected date: 2021         Discussion:  Her family history of sudden death and premature atherosclerosis is concerning    She does have mild dyslipidemia based on labs performed in 2019  She will need a follow-up fasting lipid profile next year  Will plan for further risk stratification with calcium scoring next year as well  Currently will obtain echo since she is pregnant and has some shortness of breath with findings of low voltage on EKG  Chief Complaint   Patient presents with    New Patient Visit     establish cardiac care, history of Heart disease in family     Shortness of Breath     due to pregnancy       HPI:  Andre Vaca is a 33y o -year-old female who presents to the cardiology clinic for evaluation  She is currently pregnant (about 10 weeks) and  has been referred here for further cardiac evaluation in view of her family history of premature heart disease and sudden cardiac death  Reports a family history of aortic stenosis and sudden cardiac death  She states that both of her aunt's passed away in their thirties due to cardiac issues  Her grandmother was diagnosed with aortic stenosis in her thirties her mother recently passed away after lung surgery  She reports some mild shortness of breath on exertion that she attributes to being pregnant  The exact underlying cardiac pathology in the family members is unknown  However her last remaining and will still living, was diagnosed to have aortic atherosclerosis at the age of 40  She states that 2 of the other aunts also had premature aortic atherosclerosis but they are mostly had history of hypertension, diabetes or tobacco smoking as well  Cardiac risk factors include family history of premature cardiovascular disease and smoking/ tobacco exposure (for 3 years)  Works at desk job      BP Readings from Last 4 Encounters:  06/07/21 : 112/68  05/06/21 : 102/64  06/02/20 : 118/78  01/27/20 : 118/71     Wt Readings from Last 3 Encounters:  06/07/21 : 68 kg (150 lb)  05/06/21 : 68 2 kg (150 lb 4 oz)  06/02/20 : 69 6 kg (153 lb 6 oz)      Lab Results       Component Value               Date                       LDLCALC                  121 (H)             2019            Lab Results       Component                Value               Date                       CREATININE               0 69                2019                EKG: Sinus rhythm, low voltage, poor R-wave progression, no Q-waves or ST abnormalities        Cardiology Problem list:  Family history of sudden cardiac death  Atherosclerosis of aorta mentioned on death certificates of relatives    ALLERGIES:  Allergies   Allergen Reactions    No Active Allergies          Current Outpatient Medications:     ketoconazole (NIZORAL) 2 % shampoo, , Disp: , Rfl:     Prenatal Vit-Fe Fumarate-FA (PRENATAL 1+1 PO), Take by mouth, Disp: , Rfl:     ergocalciferol (VITAMIN D2) 50,000 units, Take 1 capsule (50,000 Units total) by mouth once a week (Patient not taking: Reported on 2021), Disp: 4 capsule, Rfl: 3      Review of Systems   Constitutional: Negative for activity change, appetite change and unexpected weight change  HENT: Negative for trouble swallowing  Eyes: Negative for visual disturbance  Respiratory: Positive for shortness of breath  Negative for chest tightness and wheezing  Cardiovascular: Negative for chest pain, palpitations and leg swelling  Gastrointestinal: Negative for blood in stool  Endocrine: Negative for polyuria  Genitourinary: Negative for hematuria  Musculoskeletal: Negative for arthralgias  Skin: Negative for rash  Neurological: Negative for dizziness and weakness  Hematological: Does not bruise/bleed easily  Psychiatric/Behavioral: Negative for behavioral problems         Past Medical History:   Diagnosis Date    Wears glasses     can't see far away        Past Surgical History:   Procedure Laterality Date    INDUCED       D&C       Family History   Problem Relation Age of Onset    Diabetes Mother     Hypertension Mother    Oscar Claudine Early menopause Mother     Polycystic ovary syndrome Mother     Hyperlipidemia Mother     No Known Problems Father     Cancer Maternal Grandmother         vulva    Early menopause Maternal Grandmother     Heart disease Maternal Grandmother     Heart disease Maternal Aunt     Heart disease Maternal Aunt        Social History     Socioeconomic History    Marital status: Single     Spouse name: Not on file    Number of children: Not on file    Years of education: Not on file    Highest education level: Not on file   Occupational History    Occupation: Stay at home mom   Social Needs    Financial resource strain: Not on file    Food insecurity     Worry: Not on file     Inability: Not on file   [employer] needs     Medical: Not on file     Non-medical: Not on file   Tobacco Use    Smoking status: Never Smoker    Smokeless tobacco: Never Used    Tobacco comment: Former smoker, quit 2006 - As per Ana Gonzalez    Substance and Sexual Activity    Alcohol use: Not Currently     Comment: occasionally    Drug use: Never    Sexual activity: Yes   Lifestyle    Physical activity     Days per week: Not on file     Minutes per session: Not on file    Stress: Not on file   Relationships    Social connections     Talks on phone: Not on file     Gets together: Not on file     Attends Latter-day service: Not on file     Active member of club or organization: Not on file     Attends meetings of clubs or organizations: Not on file     Relationship status: Not on file    Intimate partner violence     Fear of current or ex partner: Not on file     Emotionally abused: Not on file     Physically abused: Not on file     Forced sexual activity: Not on file   Other Topics Concern    Not on file   Social History Narrative    · Do you currently or have you served in [employer]:   No      · Sexual orientation:   Heterosexual      · Caffeine intake:   Heavy      · Guns present in home:   No      · Seat belts used routinely:   Yes      · Sunscreen used routinely:   Yes      · Smoke alarm in home: Yes      · Live alone or with others:   with others      · International travel:   none      · Pets:   Yes, gecko, fish     As per Laurie          Objective:     Vitals:    06/07/21 1423   BP: 112/68   Pulse: 65   SpO2: 97%     Wt Readings from Last 3 Encounters:   06/07/21 68 kg (150 lb)   05/06/21 68 2 kg (150 lb 4 oz)   06/02/20 69 6 kg (153 lb 6 oz)     Pulse Readings from Last 3 Encounters:   06/07/21 65   05/06/21 73   06/02/20 61     BP Readings from Last 3 Encounters:   06/07/21 112/68   05/06/21 102/64   06/02/20 118/78         Physical Exam  Constitutional:       General: She is not in acute distress  HENT:      Head: Normocephalic  Eyes:      Conjunctiva/sclera: Conjunctivae normal    Neck:      Vascular: No carotid bruit  Cardiovascular:      Rate and Rhythm: Normal rate and regular rhythm  Heart sounds: S1 normal and S2 normal  No murmur  Pulmonary:      Effort: Pulmonary effort is normal       Breath sounds: Normal breath sounds  Abdominal:      Tenderness: There is no abdominal tenderness  Musculoskeletal:      Right lower leg: No edema  Left lower leg: No edema  Skin:     General: Skin is warm  Neurological:      General: No focal deficit present     Psychiatric:         Mood and Affect: Mood normal          Pertinent Laboratory/Diagnostic Studies:    Laboratory studies reviewed personally by Fabian Moran MD    BMP:   Lab Results   Component Value Date    SODIUM 136 04/09/2019    K 3 9 04/09/2019     04/09/2019    CO2 28 04/09/2019    BUN 7 04/09/2019    CREATININE 0 69 04/09/2019    CALCIUM 8 8 04/09/2019     CBC:  Lab Results   Component Value Date    WBC 5 75 04/09/2019    RBC 4 62 04/09/2019    HGB 14 1 04/09/2019    HCT 42 4 04/09/2019    MCV 92 04/09/2019    MCH 30 5 04/09/2019    RDW 12 1 04/09/2019     04/09/2019     Coags:    Lipid Profile:   No results found for: CHOL  Lab Results   Component Value Date    HDL 42 2019     Lab Results   Component Value Date    LDLCALC 121 (H) 2019     Lab Results   Component Value Date    TRIG 99 2019      Lab Results   Component Value Date    [id number] 1 630 2019     Lab Results   Component Value Date    ALT 16 2019    AST 8 2019         Imaging Studies:   No results found  Cardiac testing:   EKG reviewed personally:  Sinus rhythm, low voltage, poor R-wave progression, no Q-waves or ST abnormalities  QTc normal        Orders Placed This Encounter   Procedures    POCT ECG    Echo complete with contrast if indicated           Phyllis Kanner, MD, [facility] of the record may have been created with voice recognition software  Occasional wrong word or "sound a like" substitutions may have occurred due to the inherent limitations of voice recognition software  Read the chart carefully and recognize, using context, where substitutions have occurred  If you have any questions or concerns about the context, text or information contained within the body of this dictation, please contact myself, the provider, for further clarification  Zainab Sanabria MD  2021  2:52 PM  Sign when Signing Visit  [address] [place] [address],[address]  [place] [address]  Phone#  [phone]  Fax#  [phone]  [facility] Cardiology Office Consultation             NAME: Suzie Rangel  AGE: 33 y o  SEX: female   : 1990   MRN: [id number]    DATE: 2021  TIME: 2:42 PM    Assessment and plan:    1  Essential hypertension  -     POCT ECG    2  Family history of sudden cardiac death  -     Ambulatory referral to Cardiology    3   Dyspnea on exertion           Discussion:    Chief Complaint   Patient presents with    New Patient Visit     establish cardiac care, history of Heart disease in family     Shortness of Breath     due to pregnancy       HPI:  Suzie Rangel is a 33 y o -year-old female who presents to the cardiology clinic for evaluation  She is currently pregnant (about 10 weeks) and  has been referred here for further cardiac evaluation in view of her family history of premature heart disease and sudden cardiac death  Reports a family history of aortic stenosis and sudden cardiac death  She states that both of her aunt's passed away in their thirties due to cardiac issues  Her grandmother was diagnosed with aortic stenosis in her thirties her mother recently passed away after lung surgery  She reports some mild shortness of breath on exertion that she attributes to being pregnant  Cardiac risk factors include family history of premature cardiovascular disease and smoking/ tobacco exposure (for 3 years)  Works at [employer] job  BP Readings from Last 4 Encounters:  06/07/21 : 112/68  05/06/21 : 102/64  06/02/20 : 118/78  01/27/20 : 118/71     Wt Readings from Last 3 Encounters:  06/07/21 : 68 kg (150 lb)  05/06/21 : 68 2 kg (150 lb 4 oz)  06/02/20 : 69 6 kg (153 lb 6 oz)      Lab Results       Component                Value               Date                       LDLCALC                  121 (H)             04/09/2019            Lab Results       Component                Value               Date                       CREATININE               0 69                04/09/2019                EKG:  Sinus rhythm, low voltage, poor R-wave progression, no Q-waves or ST abnormalities        Cardiology Problem list:  Family history of sudden cardiac death  Atherosclerosis of aorta mentioned on death certificates of relatives    ALLERGIES:  Allergies   Allergen Reactions    No Active Allergies          Current Outpatient Medications:     ketoconazole (NIZORAL) 2 % shampoo, , Disp: , Rfl:     Prenatal Vit-Fe Fumarate-FA (PRENATAL 1+1 PO), Take by mouth, Disp: , Rfl:     ergocalciferol (VITAMIN D2) 50,000 units, Take 1 capsule (50,000 Units total) by mouth once a week (Patient not taking: Reported on 2021), Disp: 4 capsule, Rfl: 3      Review of Systems   Constitutional: Negative for activity change, appetite change and unexpected weight change  HENT: Negative for trouble swallowing  Eyes: Negative for visual disturbance  Respiratory: Negative for chest tightness, shortness of breath and wheezing  Cardiovascular: Negative for chest pain, palpitations and leg swelling  Gastrointestinal: Negative for blood in stool  Endocrine: Negative for polyuria  Genitourinary: Negative for hematuria  Musculoskeletal: Negative for arthralgias  Skin: Negative for rash  Neurological: Negative for dizziness and weakness  Hematological: Does not bruise/bleed easily  Psychiatric/Behavioral: Negative for behavioral problems         Past Medical History:   Diagnosis Date    Wears glasses     can't see far away        Past Surgical History:   Procedure Laterality Date    INDUCED       D&C       Family History   Problem Relation Age of Onset    Diabetes Mother     Hypertension Mother     Early menopause Mother     Polycystic ovary syndrome Mother     Hyperlipidemia Mother     No Known Problems Father     Cancer Maternal Grandmother         vulva    Early menopause Maternal Grandmother     Heart disease Maternal Grandmother     Heart disease Maternal Aunt     Heart disease Maternal Aunt        Social History     Socioeconomic History    Marital status: Single     Spouse name: Not on file    Number of children: Not on file    Years of education: Not on file    Highest education level: Not on file   Occupational History    Occupation: Stay at home mom   Social Needs    Financial resource strain: Not on file    Food insecurity     Worry: Not on file     Inability: Not on file    Transportation needs     Medical: Not on file     Non-medical: Not on file   Tobacco Use    Smoking status: Never Smoker    Smokeless tobacco: Never Used    Tobacco comment: Former smoker, quit 2006 - As per [place]    Substance and Sexual Activity    Alcohol use: Not Currently     Comment: occasionally    Drug use: Never    Sexual activity: Yes   Lifestyle    Physical activity     Days per week: Not on file     Minutes per session: Not on file    Stress: Not on file   Relationships    Social connections     Talks on phone: Not on file     Gets together: Not on file     Attends Sabianist service: Not on file     Active member of club or organization: Not on file     Attends meetings of clubs or organizations: Not on file     Relationship status: Not on file    Intimate partner violence     Fear of current or ex partner: Not on file     Emotionally abused: Not on file     Physically abused: Not on file     Forced sexual activity: Not on file   Other Topics Concern    Not on file   Social History Narrative    · Do you currently or have you served in [employer]:   No      · Sexual orientation:   Heterosexual      · Caffeine intake:   Heavy      · Guns present in home:   No      · Seat belts used routinely:   Yes      · Sunscreen used routinely:   Yes      · Smoke alarm in home: Yes      · Live alone or with others:   with others      · International travel:   none      · Pets:   Yes, gecko, fish     As per Diamond          Objective:     Vitals:    06/07/21 1423   BP: 112/68   Pulse: 65   SpO2: 97%     Wt Readings from Last 3 Encounters:   06/07/21 68 kg (150 lb)   05/06/21 68 2 kg (150 lb 4 oz)   06/02/20 69 6 kg (153 lb 6 oz)     Pulse Readings from Last 3 Encounters:   06/07/21 65   05/06/21 73   06/02/20 61     BP Readings from Last 3 Encounters:   06/07/21 112/68   05/06/21 102/64   06/02/20 118/78         Physical Exam  Constitutional:       General: She is not in acute distress  HENT:      Head: Normocephalic  Eyes:      Conjunctiva/sclera: Conjunctivae normal    Neck:      Vascular: No carotid bruit     Cardiovascular:      Rate and Rhythm: Normal rate and regular rhythm  Heart sounds: S1 normal and S2 normal  No murmur  Pulmonary:      Effort: Pulmonary effort is normal       Breath sounds: Normal breath sounds  Abdominal:      Tenderness: There is no abdominal tenderness  Musculoskeletal:      Right lower leg: No edema  Left lower leg: No edema  Skin:     General: Skin is warm  Neurological:      General: No focal deficit present  Psychiatric:         Mood and Affect: Mood normal          Pertinent Laboratory/Diagnostic Studies:    Laboratory studies reviewed personally by Josse Horvath MD    BMP:   Lab Results   Component Value Date    SODIUM 136 04/09/2019    K 3 9 04/09/2019     04/09/2019    CO2 28 04/09/2019    BUN 7 04/09/2019    CREATININE 0 69 04/09/2019    CALCIUM 8 8 04/09/2019     CBC:  Lab Results   Component Value Date    WBC 5 75 04/09/2019    RBC 4 62 04/09/2019    HGB 14 1 04/09/2019    HCT 42 4 04/09/2019    MCV 92 04/09/2019    MCH 30 5 04/09/2019    RDW 12 1 04/09/2019     04/09/2019     Coags:    Lipid Profile:   No results found for: CHOL  Lab Results   Component Value Date    HDL 42 04/09/2019     Lab Results   Component Value Date    LDLCALC 121 (H) 04/09/2019     Lab Results   Component Value Date    TRIG 99 04/09/2019      Lab Results   Component Value Date    [id number] 1 630 04/09/2019     Lab Results   Component Value Date    ALT 16 04/09/2019    AST 8 04/09/2019         Imaging Studies:   No results found  Cardiac testing:   EKG reviewed personally:  Sinus rhythm, low voltage, poor R-wave progression, no Q-waves or ST abnormalities  Orders Placed This Encounter   Procedures    POCT ECG           Sergei Sampson MD, [facility]    Portions of the record may have been created with voice recognition software  Occasional wrong word or "sound a like" substitutions may have occurred due to the inherent limitations of voice recognition software    Read the chart carefully and recognize, using context, where substitutions have occurred  If you have any questions or concerns about the context, text or information contained within the body of this dictation, please contact myself, the provider, for further clarification

## 2021-06-07 NOTE — ASSESSMENT & PLAN NOTE
Exertional dyspnea, attributed to pregnancy  Low voltage on the EKG  Echocardiogram planned to evaluate LV systolic function , possible congenital heart disease and rule out pericardial effusion

## 2021-06-07 NOTE — PROGRESS NOTES
[facility] [place]  [address] [place] [address] [address]  [address]  [facility] [address]  Phone#  [phone]  Fax#  [phone]  [facility] Consultation             NAME: Antoinette Hutson  AGE: 33 y o  SEX: female   : 1990   MRN: [id number]    DATE: 2021  TIME: 3:00 PM    Assessment and plan:    1  Family history of sudden cardiac death  Assessment & Plan:   Family history of premature atherosclerosis, aortic valve disease and sudden death in multiple female 1st degree relatives  She also has mild dyslipidemia  She will need further risk stratification with calcium scoring once she is more than 3 months postpartum  We will plan this next year  In the meantime she will continue risk factor modification including  Regular walking and healthy diet  Her QTC is normal on the EKG  Orders:  -     Ambulatory referral to Cardiology    2  Family history of atherosclerosis  -     POCT ECG    3  Dyspnea on exertion  Assessment & Plan:   Exertional dyspnea, attributed to pregnancy  Low voltage on the EKG  Echocardiogram planned to evaluate LV systolic function , possible congenital heart disease and rule out pericardial effusion  Orders:  -     Echo complete with contrast if indicated; Future; Expected date: 2021         Discussion:  Her family history of sudden death and premature atherosclerosis is concerning  She does have mild dyslipidemia based on labs performed in 2019  She will need a follow-up fasting lipid profile next year  Will plan for further risk stratification with calcium scoring next year as well  Currently will obtain echo since she is pregnant and has some shortness of breath with findings of low voltage on EKG      Chief Complaint   Patient presents with    New Patient Visit     establish cardiac care, history of Heart disease in family     Shortness of Breath     due to pregnancy       HPI:  Antoinette Hutson is a 33y o -year-old female who presents to the cardiology clinic for evaluation  She is currently pregnant (about 10 weeks) and  has been referred here for further cardiac evaluation in view of her family history of premature heart disease and sudden cardiac death  Reports a family history of aortic stenosis and sudden cardiac death  She states that both of her aunt's passed away in their thirties due to cardiac issues  Her grandmother was diagnosed with aortic stenosis in her thirties her mother recently passed away after lung surgery  She reports some mild shortness of breath on exertion that she attributes to being pregnant  The exact underlying cardiac pathology in the family members is unknown  However her last remaining and will still living, was diagnosed to have aortic atherosclerosis at the age of 40  She states that 2 of the other aunts also had premature aortic atherosclerosis but they are mostly had history of hypertension, diabetes or tobacco smoking as well  Cardiac risk factors include family history of premature cardiovascular disease and smoking/ tobacco exposure (for 3 years)  Works at desk job  BP Readings from Last 4 Encounters:  06/07/21 : 112/68  05/06/21 : 102/64  06/02/20 : 118/78  01/27/20 : 118/71     Wt Readings from Last 3 Encounters:  06/07/21 : 68 kg (150 lb)  05/06/21 : 68 2 kg (150 lb 4 oz)  06/02/20 : 69 6 kg (153 lb 6 oz)      Lab Results       Component                Value               Date                       LDLCALC                  121 (H)             04/09/2019            Lab Results       Component                Value               Date                       CREATININE               0 69                04/09/2019                EKG:  Sinus rhythm, low voltage, poor R-wave progression, no Q-waves or ST abnormalities        Cardiology Problem list:  Family history of sudden cardiac death  Atherosclerosis of aorta mentioned on death certificates of relatives    ALLERGIES:  Allergies   Allergen Reactions  No Active Allergies          Current Outpatient Medications:     ketoconazole (NIZORAL) 2 % shampoo, , Disp: , Rfl:     Prenatal Vit-Fe Fumarate-FA (PRENATAL 1+1 PO), Take by mouth, Disp: , Rfl:     ergocalciferol (VITAMIN D2) 50,000 units, Take 1 capsule (50,000 Units total) by mouth once a week (Patient not taking: Reported on 2021), Disp: 4 capsule, Rfl: 3      Review of Systems   Constitutional: Negative for activity change, appetite change and unexpected weight change  HENT: Negative for trouble swallowing  Eyes: Negative for visual disturbance  Respiratory: Positive for shortness of breath  Negative for chest tightness and wheezing  Cardiovascular: Negative for chest pain, palpitations and leg swelling  Gastrointestinal: Negative for blood in stool  Endocrine: Negative for polyuria  Genitourinary: Negative for hematuria  Musculoskeletal: Negative for arthralgias  Skin: Negative for rash  Neurological: Negative for dizziness and weakness  Hematological: Does not bruise/bleed easily  Psychiatric/Behavioral: Negative for behavioral problems         Past Medical History:   Diagnosis Date    Wears glasses     can't see far away        Past Surgical History:   Procedure Laterality Date    INDUCED       D&C       Family History   Problem Relation Age of Onset    Diabetes Mother     Hypertension Mother     Early menopause Mother     Polycystic ovary syndrome Mother     Hyperlipidemia Mother     No Known Problems Father     Cancer Maternal Grandmother         vulva    Early menopause Maternal Grandmother     Heart disease Maternal Grandmother     Heart disease Maternal Aunt     Heart disease Maternal Aunt        Social History     Socioeconomic History    Marital status: Single     Spouse name: Not on file    Number of children: Not on file    Years of education: Not on file    Highest education level: Not on file   Occupational History    Occupation: Stay at home mom   Social Needs    Financial resource strain: Not on file    Food insecurity     Worry: Not on file     Inability: Not on file    Transportation needs     Medical: Not on file     Non-medical: Not on file   Tobacco Use    Smoking status: Never Smoker    Smokeless tobacco: Never Used    Tobacco comment: Former smoker, quit 2006 - As per Adolfo Carroll    Substance and Sexual Activity    Alcohol use: Not Currently     Comment: occasionally    Drug use: Never    Sexual activity: Yes   Lifestyle    Physical activity     Days per week: Not on file     Minutes per session: Not on file    Stress: Not on file   Relationships    Social connections     Talks on phone: Not on file     Gets together: Not on file     Attends [employer] service: Not on file     Active member of club or organization: Not on file     Attends meetings of clubs or organizations: Not on file     Relationship status: Not on file    Intimate partner violence     Fear of current or ex partner: Not on file     Emotionally abused: Not on file     Physically abused: Not on file     Forced sexual activity: Not on file   Other Topics Concern    Not on file   Social History Narrative    · Do you currently or have you served in [employer]:   No      · Sexual orientation:   Heterosexual      · Caffeine intake:   Heavy      · Guns present in home:   No      · Seat belts used routinely:   Yes      · Sunscreen used routinely:   Yes      · Smoke alarm in home:    Yes      · Live alone or with others:   with others      · International travel:   none      · Pets:   Yes, gecko, fish     As per Laurie          Objective:     Vitals:    06/07/21 1423   BP: 112/68   Pulse: 65   SpO2: 97%     Wt Readings from Last 3 Encounters:   06/07/21 68 kg (150 lb)   05/06/21 68 2 kg (150 lb 4 oz)   06/02/20 69 6 kg (153 lb 6 oz)     Pulse Readings from Last 3 Encounters:   06/07/21 65   05/06/21 73   06/02/20 61     BP Readings from Last 3 Encounters: 06/07/21 112/68   05/06/21 102/64   06/02/20 118/78         Physical Exam  Constitutional:       General: She is not in acute distress  HENT:      Head: Normocephalic  Eyes:      Conjunctiva/sclera: Conjunctivae normal    Neck:      Vascular: No carotid bruit  Cardiovascular:      Rate and Rhythm: Normal rate and regular rhythm  Heart sounds: S1 normal and S2 normal  No murmur  Pulmonary:      Effort: Pulmonary effort is normal       Breath sounds: Normal breath sounds  Abdominal:      Tenderness: There is no abdominal tenderness  Musculoskeletal:      Right lower leg: No edema  Left lower leg: No edema  Skin:     General: Skin is warm  Neurological:      General: No focal deficit present  Psychiatric:         Mood and Affect: Mood normal          Pertinent Laboratory/Diagnostic Studies:    Laboratory studies reviewed personally by Lynn Richardson MD    BMP:   Lab Results   Component Value Date    SODIUM 136 04/09/2019    K 3 9 04/09/2019     04/09/2019    CO2 28 04/09/2019    BUN 7 04/09/2019    CREATININE 0 69 04/09/2019    CALCIUM 8 8 04/09/2019     CBC:  Lab Results   Component Value Date    WBC 5 75 04/09/2019    RBC 4 62 04/09/2019    HGB 14 1 04/09/2019    HCT 42 4 04/09/2019    MCV 92 04/09/2019    MCH 30 5 04/09/2019    RDW 12 1 04/09/2019     04/09/2019     Coags:    Lipid Profile:   No results found for: CHOL  Lab Results   Component Value Date    HDL 42 04/09/2019     Lab Results   Component Value Date    LDLCALC 121 (H) 04/09/2019     Lab Results   Component Value Date    TRIG 99 04/09/2019      Lab Results   Component Value Date    UPC0TERVLHCM 1 630 04/09/2019     Lab Results   Component Value Date    ALT 16 04/09/2019    AST 8 04/09/2019         Imaging Studies:   No results found  Cardiac testing:   EKG reviewed personally:  Sinus rhythm, low voltage, poor R-wave progression, no Q-waves or ST abnormalities  QTc normal        Orders Placed This Encounter Procedures    POCT ECG    Echo complete with contrast if indicated           Gela Ayala MD, [facility] of the record may have been created with voice recognition software  Occasional wrong word or "sound a like" substitutions may have occurred due to the inherent limitations of voice recognition software  Read the chart carefully and recognize, using context, where substitutions have occurred  If you have any questions or concerns about the context, text or information contained within the body of this dictation, please contact myself, the provider, for further clarification

## 2021-06-07 NOTE — ASSESSMENT & PLAN NOTE
Family history of premature atherosclerosis, aortic valve disease and sudden death in multiple female 1st degree relatives  She also has mild dyslipidemia  She will need further risk stratification with calcium scoring once she is more than 3 months postpartum  We will plan this next year  In the meantime she will continue risk factor modification including  Regular walking and healthy diet  Her QTC is normal on the EKG

## 2021-07-06 ENCOUNTER — HOSPITAL ENCOUNTER (OUTPATIENT)
Dept: NON INVASIVE DIAGNOSTICS | Facility: CLINIC | Age: 31
Discharge: HOME | End: 2021-07-06
Payer: COMMERCIAL

## 2021-07-06 DIAGNOSIS — R06.00 DYSPNEA ON EXERTION: ICD-10-CM

## 2021-07-06 PROCEDURE — 93306 TTE W/DOPPLER COMPLETE: CPT

## 2021-07-06 PROCEDURE — 93306 TTE W/DOPPLER COMPLETE: CPT | Performed by: INTERNAL MEDICINE

## 2021-07-06 PROCEDURE — 93356 MYOCRD STRAIN IMG SPCKL TRCK: CPT

## 2021-07-06 NOTE — RESULT ENCOUNTER NOTE
ECHO reviewed:   Normal pumping strength of the heart muscle  Valves: No serious abnormalities seen  No fluid seen around the heart  Overall these results are reassuring  Please call patient

## 2022-07-01 ENCOUNTER — OFFICE VISIT (OUTPATIENT)
Dept: FAMILY MEDICINE CLINIC | Facility: CLINIC | Age: 32
End: 2022-07-01
Payer: COMMERCIAL

## 2022-07-01 VITALS
OXYGEN SATURATION: 98 % | WEIGHT: 153 LBS | HEIGHT: 64 IN | SYSTOLIC BLOOD PRESSURE: 108 MMHG | BODY MASS INDEX: 26.12 KG/M2 | RESPIRATION RATE: 16 BRPM | DIASTOLIC BLOOD PRESSURE: 68 MMHG | HEART RATE: 62 BPM | TEMPERATURE: 98.6 F

## 2022-07-01 DIAGNOSIS — E55.9 VITAMIN D INSUFFICIENCY: ICD-10-CM

## 2022-07-01 DIAGNOSIS — K21.9 GASTROESOPHAGEAL REFLUX DISEASE WITHOUT ESOPHAGITIS: ICD-10-CM

## 2022-07-01 DIAGNOSIS — J02.9 SORE THROAT: ICD-10-CM

## 2022-07-01 DIAGNOSIS — E04.1 THYROID NODULE: Primary | ICD-10-CM

## 2022-07-01 DIAGNOSIS — Z00.00 HEALTHCARE MAINTENANCE: ICD-10-CM

## 2022-07-01 PROBLEM — R87.810 CERVICAL HIGH RISK HUMAN PAPILLOMAVIRUS (HPV) DNA TEST POSITIVE: Status: ACTIVE | Noted: 2021-07-06

## 2022-07-01 PROCEDURE — 99214 OFFICE O/P EST MOD 30 MIN: CPT | Performed by: FAMILY MEDICINE

## 2022-07-01 PROCEDURE — 99395 PREV VISIT EST AGE 18-39: CPT | Performed by: FAMILY MEDICINE

## 2022-07-01 RX ORDER — PANTOPRAZOLE SODIUM 40 MG/1
40 TABLET, DELAYED RELEASE ORAL
Qty: 30 TABLET | Refills: 1 | Status: SHIPPED | OUTPATIENT
Start: 2022-07-01 | End: 2022-08-01

## 2022-07-01 NOTE — PROGRESS NOTES
Assessment/Plan:  Healthcare maintenance  It was discussed about immunizations, diet, exercise and safety measures  Thyroid nodule  I am going to check thyroid ultrasound  Gastroesophageal reflux disease without esophagitis  She was given prescription for Protonix  Sore throat  She was given prescription for Protonix  If symptoms are not improving I will consider referral to ENT  Diagnoses and all orders for this visit:    Thyroid nodule  -     US thyroid; Future    Healthcare maintenance  -     CBC and differential; Future  -     Comprehensive metabolic panel; Future  -     Lipid Panel with Direct LDL reflex; Future  -     Hemoglobin A1C; Future  -     TSH, 3rd generation with Free T4 reflex; Future    Gastroesophageal reflux disease without esophagitis  -     pantoprazole (PROTONIX) 40 mg tablet; Take 1 tablet (40 mg total) by mouth daily before breakfast    Sore throat  -     pantoprazole (PROTONIX) 40 mg tablet; Take 1 tablet (40 mg total) by mouth daily before breakfast    Vitamin D insufficiency  -     Vitamin D 25 hydroxy; Future    BMI 26 0-26 9,adult        There are no Patient Instructions on file for this visit  Return in about 1 month (around 8/1/2022)  Subjective:      Patient ID: Graham Huynh is a 27 y o  female  Chief Complaint   Patient presents with    Physical Exam       She is here today for wellness exam   She complained of chronic sore throat for the last few months  Denies any cough  She had history of nodule in her thyroid  Also states channel diabetes  She denies any other complaint  The following portions of the patient's history were reviewed and updated as appropriate: allergies, current medications, past family history, past medical history, past social history, past surgical history and problem list     Review of Systems   Constitutional: Negative for chills and fever  HENT: Positive for sore throat  Negative for trouble swallowing      Eyes: Negative for visual disturbance  Respiratory: Negative for cough and shortness of breath  Cardiovascular: Negative for chest pain, palpitations and leg swelling  Gastrointestinal: Negative for abdominal pain, constipation and diarrhea  Endocrine: Negative for cold intolerance and heat intolerance  Genitourinary: Negative for difficulty urinating and dysuria  Musculoskeletal: Negative for gait problem  Skin: Negative for rash  Neurological: Negative for dizziness, tremors, seizures and headaches  Hematological: Negative for adenopathy  Psychiatric/Behavioral: Negative for behavioral problems  Current Outpatient Medications   Medication Sig Dispense Refill    ketoconazole (NIZORAL) 2 % shampoo       pantoprazole (PROTONIX) 40 mg tablet Take 1 tablet (40 mg total) by mouth daily before breakfast 30 tablet 1    ergocalciferol (VITAMIN D2) 50,000 units Take 1 capsule (50,000 Units total) by mouth once a week (Patient not taking: No sig reported) 4 capsule 3    Prenatal Vit-Fe Fumarate-FA (PRENATAL 1+1 PO) Take by mouth (Patient not taking: Reported on 7/1/2022)       No current facility-administered medications for this visit  Objective:    /68 (BP Location: Left arm, Patient Position: Sitting, Cuff Size: Adult)   Pulse 62   Temp 98 6 °F (37 °C) (Tympanic)   Resp 16   Ht 5' 4" (1 626 m)   Wt 69 4 kg (153 lb)   SpO2 98%   BMI 26 26 kg/m²        Physical Exam  Vitals and nursing note reviewed  Constitutional:       Appearance: She is well-developed  HENT:      Head: Normocephalic and atraumatic  Eyes:      Pupils: Pupils are equal, round, and reactive to light  Cardiovascular:      Rate and Rhythm: Normal rate and regular rhythm  Heart sounds: Normal heart sounds  Pulmonary:      Effort: Pulmonary effort is normal       Breath sounds: Normal breath sounds  Abdominal:      General: Bowel sounds are normal       Palpations: Abdomen is soft  Musculoskeletal:         General: Normal range of motion  Cervical back: Normal range of motion and neck supple  Lymphadenopathy:      Cervical: No cervical adenopathy  Skin:     General: Skin is warm  Neurological:      Mental Status: She is alert and oriented to person, place, and time  Cranial Nerves: No cranial nerve deficit  Cristal Camacho MD BMI Counseling: Body mass index is 26 26 kg/m²  The BMI is above normal  Nutrition recommendations include reducing portion sizes, decreasing overall calorie intake and 3-5 servings of fruits/vegetables daily  Exercise recommendations include moderate aerobic physical activity for 150 minutes/week

## 2022-07-01 NOTE — PROGRESS NOTES
Assessment/Plan:  Healthcare maintenance  It was discussed about immunizations, diet, exercise and safety measures  Thyroid nodule  I am going to check thyroid ultrasound  Gastroesophageal reflux disease without esophagitis  She was given prescription for Protonix  Sore throat  She was given prescription for Protonix  If symptoms are not improving I will consider referral to ENT  Diagnoses and all orders for this visit:    Thyroid nodule  -     US thyroid; Future    Healthcare maintenance  -     CBC and differential; Future  -     Comprehensive metabolic panel; Future  -     Lipid Panel with Direct LDL reflex; Future  -     Hemoglobin A1C; Future  -     TSH, 3rd generation with Free T4 reflex; Future    Gastroesophageal reflux disease without esophagitis  -     pantoprazole (PROTONIX) 40 mg tablet; Take 1 tablet (40 mg total) by mouth daily before breakfast    Sore throat  -     pantoprazole (PROTONIX) 40 mg tablet; Take 1 tablet (40 mg total) by mouth daily before breakfast    Vitamin D insufficiency  -     Vitamin D 25 hydroxy; Future    BMI 26 0-26 9,adult        There are no Patient Instructions on file for this visit  Return in about 1 month (around 8/1/2022)  Subjective:      Patient ID: Colleen Connors is a 27 y o  female  Chief Complaint   Patient presents with    Physical Exam       She is here today for wellness exam   She complained of chronic sore throat for the last few months  Denies any cough  She had history of nodule in her thyroid  Also states channel diabetes  She denies any other complaint  The following portions of the patient's history were reviewed and updated as appropriate: allergies, current medications, past family history, past medical history, past social history, past surgical history and problem list     Review of Systems   Constitutional: Negative for chills and fever  HENT: Positive for sore throat  Negative for trouble swallowing      Eyes: Negative for visual disturbance  Respiratory: Negative for cough and shortness of breath  Cardiovascular: Negative for chest pain, palpitations and leg swelling  Gastrointestinal: Negative for abdominal pain, constipation and diarrhea  Endocrine: Negative for cold intolerance and heat intolerance  Genitourinary: Negative for difficulty urinating and dysuria  Musculoskeletal: Negative for gait problem  Skin: Negative for rash  Neurological: Negative for dizziness, tremors, seizures and headaches  Hematological: Negative for adenopathy  Psychiatric/Behavioral: Negative for behavioral problems  Current Outpatient Medications   Medication Sig Dispense Refill    ketoconazole (NIZORAL) 2 % shampoo       pantoprazole (PROTONIX) 40 mg tablet Take 1 tablet (40 mg total) by mouth daily before breakfast 30 tablet 1    ergocalciferol (VITAMIN D2) 50,000 units Take 1 capsule (50,000 Units total) by mouth once a week (Patient not taking: No sig reported) 4 capsule 3    Prenatal Vit-Fe Fumarate-FA (PRENATAL 1+1 PO) Take by mouth (Patient not taking: Reported on 7/1/2022)       No current facility-administered medications for this visit  Objective:    /68 (BP Location: Left arm, Patient Position: Sitting, Cuff Size: Adult)   Pulse 62   Temp 98 6 °F (37 °C) (Tympanic)   Resp 16   Ht 5' 4" (1 626 m)   Wt 69 4 kg (153 lb)   SpO2 98%   BMI 26 26 kg/m²        Physical Exam  Vitals and nursing note reviewed  Constitutional:       Appearance: She is well-developed  HENT:      Head: Normocephalic and atraumatic  Eyes:      Pupils: Pupils are equal, round, and reactive to light  Cardiovascular:      Rate and Rhythm: Normal rate and regular rhythm  Heart sounds: Normal heart sounds  Pulmonary:      Effort: Pulmonary effort is normal       Breath sounds: Normal breath sounds  Abdominal:      General: Bowel sounds are normal       Palpations: Abdomen is soft  Musculoskeletal:         General: Normal range of motion  Cervical back: Normal range of motion and neck supple  Lymphadenopathy:      Cervical: No cervical adenopathy  Skin:     General: Skin is warm  Neurological:      Mental Status: She is alert and oriented to person, place, and time  Cranial Nerves: No cranial nerve deficit  Gurwinder Rene MD BMI Counseling: Body mass index is 26 26 kg/m²  The BMI is above normal  Nutrition recommendations include reducing portion sizes, decreasing overall calorie intake and 3-5 servings of fruits/vegetables daily  Exercise recommendations include moderate aerobic physical activity for 150 minutes/week

## 2022-07-01 NOTE — ASSESSMENT & PLAN NOTE
She was given prescription for Protonix  If symptoms are not improving I will consider referral to ENT

## 2022-07-18 ENCOUNTER — HOSPITAL ENCOUNTER (OUTPATIENT)
Dept: RADIOLOGY | Facility: IMAGING CENTER | Age: 32
Discharge: HOME | End: 2022-07-18
Payer: COMMERCIAL

## 2022-07-18 DIAGNOSIS — E04.1 THYROID NODULE: ICD-10-CM

## 2022-07-18 PROCEDURE — 76536 US EXAM OF HEAD AND NECK: CPT

## 2022-08-01 ENCOUNTER — OFFICE VISIT (OUTPATIENT)
Dept: FAMILY MEDICINE CLINIC | Facility: CLINIC | Age: 32
End: 2022-08-01
Payer: COMMERCIAL

## 2022-08-01 ENCOUNTER — TELEPHONE (OUTPATIENT)
Dept: FAMILY MEDICINE CLINIC | Facility: CLINIC | Age: 32
End: 2022-08-01

## 2022-08-01 VITALS
DIASTOLIC BLOOD PRESSURE: 72 MMHG | TEMPERATURE: 98.7 F | HEIGHT: 64 IN | OXYGEN SATURATION: 98 % | RESPIRATION RATE: 14 BRPM | BODY MASS INDEX: 26.05 KG/M2 | HEART RATE: 58 BPM | WEIGHT: 152.6 LBS | SYSTOLIC BLOOD PRESSURE: 116 MMHG

## 2022-08-01 DIAGNOSIS — E78.49 OTHER HYPERLIPIDEMIA: ICD-10-CM

## 2022-08-01 DIAGNOSIS — E04.1 THYROID NODULE: ICD-10-CM

## 2022-08-01 DIAGNOSIS — K21.9 GASTROESOPHAGEAL REFLUX DISEASE WITHOUT ESOPHAGITIS: Primary | ICD-10-CM

## 2022-08-01 PROCEDURE — 99214 OFFICE O/P EST MOD 30 MIN: CPT | Performed by: FAMILY MEDICINE

## 2022-08-01 NOTE — PROGRESS NOTES
Assessment/Plan:    Gastroesophageal reflux disease without esophagitis  Patient took Protonix for 2 weeks and then discontinued  Symptoms well controlled without medication  Will continue to monitor and follow up as needed  Thyroid nodule  Thyroid US revealed small nodules < 5 mm bilaterally that don't meet current ACR criteria for biopsy or follow up  Will continue to monitor  Other hyperlipidemia  Lipid profile revealed elevated LDL (136) and decreased HDL (32)  Recommend low-fat diet and regular exercise  Will continue to monitor with lipid panel and follow up in 6 months  Problem List Items Addressed This Visit        Digestive    Gastroesophageal reflux disease without esophagitis - Primary     Patient took Protonix for 2 weeks and then discontinued  Symptoms well controlled without medication  Will continue to monitor and follow up as needed  Endocrine    Thyroid nodule     Thyroid US revealed small nodules < 5 mm bilaterally that don't meet current ACR criteria for biopsy or follow up  Will continue to monitor  Other    Other hyperlipidemia     Lipid profile revealed elevated LDL (136) and decreased HDL (32)  Recommend low-fat diet and regular exercise  Will continue to monitor with lipid panel and follow up in 6 months  Relevant Orders    Lipid Panel with Direct LDL reflex            Subjective:      Patient ID: Celina Berman is a 27 y o  female  HPI  Patient is a 58-year-old female with a PMH of GERD and thyroid nodule presenting today for a 1-month follow up of multiple medical problems  Patient was prescribed Protonix for GERD and a chronic sore throat  She took the medication for 2 weeks and then discontinued it  She reports resolution of symptoms and denies any heartburn, regurgitation, sore throat, N/V, or abdominal pain       Recent ultrasound of thyroid showed small nodules less than 5 mm in size bilaterally that did not meet current ACR criteria for biopsy or follow-up  Recent blood work showed lipid panel with elevated LDL (136) and low HDL (32)  Remaining labs WNL  The following portions of the patient's history were reviewed and updated as appropriate: allergies, current medications, past family history, past medical history, past social history, past surgical history and problem list     Review of Systems   Constitutional: Negative for chills, diaphoresis, fatigue and fever  HENT: Negative for ear pain and sore throat  Eyes: Negative for pain and visual disturbance  Respiratory: Negative for cough and shortness of breath  Cardiovascular: Negative for chest pain, palpitations and leg swelling  Gastrointestinal: Negative for abdominal pain, constipation, diarrhea, nausea and vomiting  Endocrine: Negative for cold intolerance and heat intolerance  Genitourinary: Negative for dysuria and hematuria  Musculoskeletal: Negative for arthralgias and back pain  Skin: Negative for color change and rash  Neurological: Negative for dizziness, seizures, syncope and light-headedness  All other systems reviewed and are negative  Objective:      /72 (BP Location: Left arm, Patient Position: Sitting, Cuff Size: Adult)   Pulse 58   Temp 98 7 °F (37 1 °C) (Tympanic)   Resp 14   Ht 5' 4" (1 626 m)   Wt 69 2 kg (152 lb 9 6 oz)   SpO2 98%   BMI 26 19 kg/m²          Physical Exam  Constitutional:       Appearance: Normal appearance  HENT:      Head: Normocephalic and atraumatic  Cardiovascular:      Rate and Rhythm: Normal rate and regular rhythm  Heart sounds: No murmur heard  No friction rub  No gallop  Pulmonary:      Effort: Pulmonary effort is normal       Breath sounds: Normal breath sounds  No wheezing, rhonchi or rales  Musculoskeletal:         General: Normal range of motion  Cervical back: Normal range of motion  Skin:     General: Skin is warm and dry     Neurological:      General: No focal deficit present  Mental Status: She is alert     Psychiatric:         Mood and Affect: Mood normal

## 2022-08-01 NOTE — ASSESSMENT & PLAN NOTE
Thyroid US revealed small nodules < 5 mm bilaterally that don't meet current ACR criteria for biopsy or follow up  Will continue to monitor

## 2022-08-01 NOTE — TELEPHONE ENCOUNTER
----- Message from Babita Galarza MD sent at 7/31/2022  3:02 PM EDT -----    Very small nodules less than 5 mm  No need for further evaluation

## 2022-08-01 NOTE — ASSESSMENT & PLAN NOTE
Lipid profile revealed elevated LDL (136) and decreased HDL (32)  Recommend low-fat diet and regular exercise  Will continue to monitor with lipid panel and follow up in 6 months

## 2022-08-01 NOTE — ASSESSMENT & PLAN NOTE
Patient took Protonix for 2 weeks and then discontinued  Symptoms well controlled without medication  Will continue to monitor and follow up as needed

## 2022-08-30 ENCOUNTER — TELEPHONE (OUTPATIENT)
Dept: FAMILY MEDICINE CLINIC | Facility: CLINIC | Age: 32
End: 2022-08-30

## 2022-08-30 NOTE — TELEPHONE ENCOUNTER
Received fax from [place] [id number]  Requesting dx code for A1C    Faxed back dx; hyperglycemia [id number]